# Patient Record
Sex: MALE | Race: WHITE | NOT HISPANIC OR LATINO | URBAN - METROPOLITAN AREA
[De-identification: names, ages, dates, MRNs, and addresses within clinical notes are randomized per-mention and may not be internally consistent; named-entity substitution may affect disease eponyms.]

---

## 2018-03-22 ENCOUNTER — EMERGENCY (EMERGENCY)
Facility: HOSPITAL | Age: 38
LOS: 1 days | Discharge: HOME | End: 2018-03-22
Attending: EMERGENCY MEDICINE | Admitting: EMERGENCY MEDICINE
Payer: COMMERCIAL

## 2018-03-22 VITALS
HEART RATE: 95 BPM | SYSTOLIC BLOOD PRESSURE: 159 MMHG | OXYGEN SATURATION: 100 % | DIASTOLIC BLOOD PRESSURE: 75 MMHG | RESPIRATION RATE: 20 BRPM

## 2018-03-22 VITALS
RESPIRATION RATE: 18 BRPM | OXYGEN SATURATION: 97 % | HEART RATE: 88 BPM | SYSTOLIC BLOOD PRESSURE: 118 MMHG | DIASTOLIC BLOOD PRESSURE: 76 MMHG | TEMPERATURE: 98 F

## 2018-03-22 PROCEDURE — 99283 EMERGENCY DEPT VISIT LOW MDM: CPT

## 2018-03-22 PROCEDURE — 73562 X-RAY EXAM OF KNEE 3: CPT

## 2018-03-22 PROCEDURE — 73562 X-RAY EXAM OF KNEE 3: CPT | Mod: 26,LT

## 2018-03-22 PROCEDURE — 99283 EMERGENCY DEPT VISIT LOW MDM: CPT | Mod: 25

## 2018-03-22 RX ORDER — IBUPROFEN 200 MG
600 TABLET ORAL ONCE
Qty: 0 | Refills: 0 | Status: COMPLETED | OUTPATIENT
Start: 2018-03-22 | End: 2018-03-22

## 2018-03-22 RX ORDER — ACETAMINOPHEN 500 MG
975 TABLET ORAL ONCE
Qty: 0 | Refills: 0 | Status: COMPLETED | OUTPATIENT
Start: 2018-03-22 | End: 2018-03-22

## 2018-03-22 RX ADMIN — Medication 600 MILLIGRAM(S): at 12:26

## 2018-03-22 RX ADMIN — Medication 975 MILLIGRAM(S): at 12:25

## 2018-03-22 NOTE — ED PROVIDER NOTE - OBJECTIVE STATEMENT
37 year old, with left medial knee pain after sledding accident yesterday when an adult fell on his knee on the medial side. no medical history  or surgical history. works as a . 37 year old, with left medial knee pain after sledding accident yesterday when an adult fell on his knee on the medial side. no medical history  or surgical history. works as a  and doesn't exert himself physically at work. can bear weight and walk.

## 2018-03-22 NOTE — ED ADULT NURSE NOTE - OBJECTIVE STATEMENT
36 yo presents to the ED from home. A&Ox4 c/o L knee pain. 38 yo presents to the ED from home. A&Ox4 c/o L knee pain. pt reports that yesterday while sledding pt fell off the sled onto the hard snow. pt reports that someone fell on top of him. pt able to ambulate without difficulty. 36 yo presents to the ED from home. A&Ox4 c/o L knee pain. pt reports that yesterday while sledding pt fell off the sled onto the hard snow. pt reports that someone fell on top of him. pt able to ambulate without difficulty. pt denies numbness tingling. pt denies head trauma. pt denies LOC, blood thinners. MD at bedside for oseas.

## 2018-03-22 NOTE — ED PROVIDER NOTE - CARE PLAN
Principal Discharge DX:	Knee pain  Assessment and plan of treatment:	Follow up with your primary care doctor within 48-72 hours.   You must return for new, worsening or concerning symptoms; specifically including those listed on the attached sheet.   You may take 1000mg of Tylenol every 6 hours for baseline pain control with respect to the warnings on the label.  You may take 600mg of ibuprofen (example: motrin or advil) every 4-6 hours for baseline pain control as indicated with respect to the warnings on the label. This is an over the counter medication.   Follow up with an orthopedic doctor of your choice  listed on the attached sheet within 1 week for continued evaluation and care of your injury. Call the office tomorrow and explain your condition and make an appointment as soon as possible.  Keep you knee wrapped for compression, ice it as needed for 20 minutes on at a time, keep elevated and rest your knee

## 2018-03-22 NOTE — ED PROVIDER NOTE - MEDICAL DECISION MAKING DETAILS
lSim PGY3: traumatic knee pain on left, yesterday, while sledding. no acute deformity, likely soft tissue. will xray, nsaids, arrange follow up with ortho/sports Slim PGY3: traumatic knee pain on left, yesterday, while sledding. no acute deformity, likely soft tissue. will xray, nsaids, arrange follow up with ortho/sports    [facility] joint line injury to knee, ext mech intact, no laxity, possible ligamentous or meniscal injury -- xray low suspicion for fx -- then RICE NSAids , and ortho fu -- outpt mri for peristant s/s

## 2021-10-03 NOTE — ED ADULT NURSE NOTE - CHIEF COMPLAINT QUOTE
Telemetry Shift Summary    Rhythm v paced  HR Range 71  Ectopy R PVC  Measurements 0/0.14/0        Normal Values  Rhythm SR  HR Range    Measurements 0.12-0.20 / 0.06-0.10  / 0.30-0.52     l knee inj

## 2023-09-13 NOTE — ED PROVIDER NOTE - PLAN OF CARE
Detail Level: Detailed
Follow up with your primary care doctor within 48-72 hours.   You must return for new, worsening or concerning symptoms; specifically including those listed on the attached sheet.   You may take 1000mg of Tylenol every 6 hours for baseline pain control with respect to the warnings on the label.  You may take 600mg of ibuprofen (example: motrin or advil) every 4-6 hours for baseline pain control as indicated with respect to the warnings on the label. This is an over the counter medication.   Follow up with an orthopedic doctor of your choice  listed on the attached sheet within 1 week for continued evaluation and care of your injury. Call the office tomorrow and explain your condition and make an appointment as soon as possible.  Keep you knee wrapped for compression, ice it as needed for 20 minutes on at a time, keep elevated and rest your knee